# Patient Record
Sex: FEMALE | NOT HISPANIC OR LATINO
[De-identification: names, ages, dates, MRNs, and addresses within clinical notes are randomized per-mention and may not be internally consistent; named-entity substitution may affect disease eponyms.]

---

## 2018-12-19 ENCOUNTER — HOSPITAL (OUTPATIENT)
Dept: OTHER | Age: 33
End: 2018-12-19

## 2020-12-23 ENCOUNTER — HOSPITAL ENCOUNTER (OUTPATIENT)
Dept: ULTRASOUND IMAGING | Age: 35
Discharge: HOME | End: 2020-12-23
Attending: FAMILY MEDICINE

## 2020-12-23 DIAGNOSIS — E04.1 NONTOXIC SINGLE THYROID NODULE: ICD-10-CM

## 2020-12-23 PROCEDURE — 76536 US EXAM OF HEAD AND NECK: CPT

## 2021-02-03 ENCOUNTER — APPOINTMENT (OUTPATIENT)
Dept: ULTRASOUND IMAGING | Age: 36
End: 2021-02-03
Attending: OTOLARYNGOLOGY

## 2021-02-05 ENCOUNTER — LAB SERVICES (OUTPATIENT)
Dept: LAB | Age: 36
End: 2021-02-05

## 2021-02-05 DIAGNOSIS — Z01.818 PREPROCEDURAL EXAMINATION: Primary | ICD-10-CM

## 2021-02-05 LAB
SARS-COV-2 RNA RESP QL NAA+PROBE: NOT DETECTED
SERVICE CMNT-IMP: NORMAL
SERVICE CMNT-IMP: NORMAL

## 2021-02-05 PROCEDURE — U0003 INFECTIOUS AGENT DETECTION BY NUCLEIC ACID (DNA OR RNA); SEVERE ACUTE RESPIRATORY SYNDROME CORONAVIRUS 2 (SARS-COV-2) (CORONAVIRUS DISEASE [COVID-19]), AMPLIFIED PROBE TECHNIQUE, MAKING USE OF HIGH THROUGHPUT TECHNOLOGIES AS DESCRIBED BY CMS-2020-01-R: HCPCS | Performed by: RADIOLOGY

## 2021-02-05 PROCEDURE — U0005 INFEC AGEN DETEC AMPLI PROBE: HCPCS | Performed by: RADIOLOGY

## 2021-02-08 ENCOUNTER — HOSPITAL ENCOUNTER (OUTPATIENT)
Dept: ULTRASOUND IMAGING | Age: 36
Discharge: HOME | End: 2021-02-08
Attending: OTOLARYNGOLOGY

## 2021-02-08 DIAGNOSIS — E04.1 THYROID NODULE: ICD-10-CM

## 2021-02-08 PROCEDURE — 88177 CYTP FNA EVAL EA ADDL: CPT | Performed by: OTOLARYNGOLOGY

## 2021-02-08 PROCEDURE — 10005 FNA BX W/US GDN 1ST LES: CPT

## 2021-02-08 PROCEDURE — 10002801 HB RX 250 W/O HCPCS: Performed by: OTOLARYNGOLOGY

## 2021-02-08 PROCEDURE — 88173 CYTOPATH EVAL FNA REPORT: CPT | Performed by: OTOLARYNGOLOGY

## 2021-02-08 RX ORDER — LIDOCAINE HYDROCHLORIDE 10 MG/ML
10 INJECTION, SOLUTION INFILTRATION; PERINEURAL ONCE
Status: COMPLETED | OUTPATIENT
Start: 2021-02-08 | End: 2021-02-08

## 2021-02-08 RX ADMIN — LIDOCAINE HYDROCHLORIDE 100 MG: 10 INJECTION, SOLUTION INFILTRATION; PERINEURAL at 14:07

## 2021-02-11 LAB
ASR DISCLAIMER: NORMAL
CASE RPRT: NORMAL
CLINICAL INFO: NORMAL
PATH REPORT.FINAL DX SPEC: NORMAL
PATH REPORT.GROSS SPEC: NORMAL
PATH REPORT.INTRAOP OBS SPEC DOC: NORMAL
PATH REPORT.MICROSCOPIC SPEC OTHER STN: NORMAL

## 2021-10-20 ENCOUNTER — OFFICE VISIT (OUTPATIENT)
Dept: INTERNAL MEDICINE CLINIC | Facility: CLINIC | Age: 36
End: 2021-10-20
Payer: COMMERCIAL

## 2021-10-20 VITALS
WEIGHT: 140.19 LBS | RESPIRATION RATE: 18 BRPM | HEART RATE: 103 BPM | TEMPERATURE: 100 F | SYSTOLIC BLOOD PRESSURE: 109 MMHG | BODY MASS INDEX: 23.36 KG/M2 | DIASTOLIC BLOOD PRESSURE: 68 MMHG | HEIGHT: 64.96 IN

## 2021-10-20 DIAGNOSIS — M79.10 MUSCLE PAIN: ICD-10-CM

## 2021-10-20 DIAGNOSIS — E55.9 VITAMIN D DEFICIENCY: ICD-10-CM

## 2021-10-20 DIAGNOSIS — L81.9 HYPOPIGMENTATION: ICD-10-CM

## 2021-10-20 DIAGNOSIS — R07.89 OTHER CHEST PAIN: ICD-10-CM

## 2021-10-20 DIAGNOSIS — E04.1 THYROID NODULE: ICD-10-CM

## 2021-10-20 DIAGNOSIS — E06.9 THYROIDITIS: ICD-10-CM

## 2021-10-20 DIAGNOSIS — Z00.00 PHYSICAL EXAM, ANNUAL: Primary | ICD-10-CM

## 2021-10-20 DIAGNOSIS — L65.9 HAIR LOSS: ICD-10-CM

## 2021-10-20 PROCEDURE — 99385 PREV VISIT NEW AGE 18-39: CPT | Performed by: INTERNAL MEDICINE

## 2021-10-20 PROCEDURE — 3008F BODY MASS INDEX DOCD: CPT | Performed by: INTERNAL MEDICINE

## 2021-10-20 PROCEDURE — 3074F SYST BP LT 130 MM HG: CPT | Performed by: INTERNAL MEDICINE

## 2021-10-20 PROCEDURE — 3078F DIAST BP <80 MM HG: CPT | Performed by: INTERNAL MEDICINE

## 2021-10-20 RX ORDER — FERROUS SULFATE 325(65) MG
TABLET ORAL
COMMUNITY
Start: 2021-08-09

## 2021-10-20 RX ORDER — LEVOTHYROXINE SODIUM 0.05 MG/1
TABLET ORAL
COMMUNITY
Start: 2021-06-07 | End: 2021-12-23

## 2021-10-20 RX ORDER — ERGOCALCIFEROL 1.25 MG/1
CAPSULE ORAL
COMMUNITY
Start: 2021-08-09

## 2021-10-21 ENCOUNTER — LAB ENCOUNTER (OUTPATIENT)
Dept: LAB | Age: 36
End: 2021-10-21
Attending: INTERNAL MEDICINE
Payer: COMMERCIAL

## 2021-10-21 ENCOUNTER — HOSPITAL ENCOUNTER (OUTPATIENT)
Dept: GENERAL RADIOLOGY | Age: 36
Discharge: HOME | End: 2021-10-21
Attending: INTERNAL MEDICINE
Payer: COMMERCIAL

## 2021-10-21 DIAGNOSIS — E06.9 THYROIDITIS: ICD-10-CM

## 2021-10-21 DIAGNOSIS — L65.9 HAIR LOSS: ICD-10-CM

## 2021-10-21 DIAGNOSIS — R07.89 OTHER CHEST PAIN: ICD-10-CM

## 2021-10-21 DIAGNOSIS — Z00.00 PHYSICAL EXAM, ANNUAL: ICD-10-CM

## 2021-10-21 DIAGNOSIS — M79.10 MUSCLE PAIN: ICD-10-CM

## 2021-10-21 DIAGNOSIS — E04.1 THYROID NODULE: ICD-10-CM

## 2021-10-21 PROCEDURE — 82607 VITAMIN B-12: CPT

## 2021-10-21 PROCEDURE — 85025 COMPLETE CBC W/AUTO DIFF WBC: CPT

## 2021-10-21 PROCEDURE — 84443 ASSAY THYROID STIM HORMONE: CPT

## 2021-10-21 PROCEDURE — 36415 COLL VENOUS BLD VENIPUNCTURE: CPT

## 2021-10-21 PROCEDURE — 80061 LIPID PANEL: CPT

## 2021-10-21 PROCEDURE — 80053 COMPREHEN METABOLIC PANEL: CPT

## 2021-10-21 PROCEDURE — 84466 ASSAY OF TRANSFERRIN: CPT

## 2021-10-21 PROCEDURE — 82746 ASSAY OF FOLIC ACID SERUM: CPT

## 2021-10-21 PROCEDURE — 83540 ASSAY OF IRON: CPT

## 2021-10-21 PROCEDURE — 84481 FREE ASSAY (FT-3): CPT

## 2021-10-21 PROCEDURE — 82728 ASSAY OF FERRITIN: CPT

## 2021-10-21 PROCEDURE — 71046 X-RAY EXAM CHEST 2 VIEWS: CPT | Performed by: INTERNAL MEDICINE

## 2021-10-21 PROCEDURE — 82306 VITAMIN D 25 HYDROXY: CPT

## 2021-10-21 PROCEDURE — 84439 ASSAY OF FREE THYROXINE: CPT

## 2021-10-21 PROCEDURE — 82550 ASSAY OF CK (CPK): CPT

## 2021-10-21 PROCEDURE — 86376 MICROSOMAL ANTIBODY EACH: CPT

## 2021-10-21 NOTE — PROGRESS NOTES
Subjective:   Patient ID: Henrik Jung is a 38year old female.   Presents for physical exam    HPI  Patient reports that she is not feeling well, 3 months ago levothyroxine dose was decreased she has been taking 50 mg 5 days a week and 25 mg 2 times a week vitamin D to treat vitamin D deficiency and iron supplement states that she has been anemic all her life.   No recent pregnancies or illness, denies rectal bleeding or excessive menstrual bleeding.  _________________    ROS:     Constitutional:  Negative fo deep cervical adenopathy. Left cervical: No superficial or deep cervical adenopathy. Neurological:      Mental Status: She is alert.          Assessment & Plan:   Physical exam, annual  (primary encounter diagnosis) patient will continue healthy diet,

## 2021-10-23 ENCOUNTER — TELEPHONE (OUTPATIENT)
Dept: FAMILY MEDICINE CLINIC | Facility: CLINIC | Age: 36
End: 2021-10-23

## 2021-11-20 ENCOUNTER — HOSPITAL ENCOUNTER (OUTPATIENT)
Dept: ULTRASOUND IMAGING | Age: 36
Discharge: HOME | End: 2021-11-20
Attending: INTERNAL MEDICINE
Payer: COMMERCIAL

## 2021-11-20 DIAGNOSIS — E06.9 THYROIDITIS: ICD-10-CM

## 2021-11-20 DIAGNOSIS — E04.1 THYROID NODULE: ICD-10-CM

## 2021-11-20 PROCEDURE — 76536 US EXAM OF HEAD AND NECK: CPT | Performed by: INTERNAL MEDICINE

## 2021-12-23 ENCOUNTER — OFFICE VISIT (OUTPATIENT)
Dept: ENDOCRINOLOGY CLINIC | Facility: CLINIC | Age: 36
End: 2021-12-23
Payer: COMMERCIAL

## 2021-12-23 VITALS — HEART RATE: 81 BPM | DIASTOLIC BLOOD PRESSURE: 67 MMHG | SYSTOLIC BLOOD PRESSURE: 114 MMHG

## 2021-12-23 DIAGNOSIS — E04.2 MULTIPLE THYROID NODULES: ICD-10-CM

## 2021-12-23 DIAGNOSIS — E03.9 HYPOTHYROIDISM, UNSPECIFIED TYPE: Primary | ICD-10-CM

## 2021-12-23 PROCEDURE — 3074F SYST BP LT 130 MM HG: CPT | Performed by: INTERNAL MEDICINE

## 2021-12-23 PROCEDURE — 99204 OFFICE O/P NEW MOD 45 MIN: CPT | Performed by: INTERNAL MEDICINE

## 2021-12-23 PROCEDURE — 3078F DIAST BP <80 MM HG: CPT | Performed by: INTERNAL MEDICINE

## 2021-12-23 RX ORDER — LEVOTHYROXINE SODIUM 0.03 MG/1
25 TABLET ORAL
COMMUNITY
End: 2021-12-23

## 2021-12-23 RX ORDER — LEVOTHYROXINE SODIUM 0.03 MG/1
25 TABLET ORAL
Qty: 90 TABLET | Refills: 0 | Status: SHIPPED | OUTPATIENT
Start: 2021-12-23 | End: 2022-03-23

## 2021-12-23 NOTE — H&P
Name: Jacobo Gregorio  Date: 12/23/2021    Referring Physician: No ref.  provider found    Patient presents with:  Consult: pt here to establish care with endocrinologist for thyroid problems; pt has been seen by an endo before todat appt; pt has been experien week., Disp: , Rfl:   •  ergocalciferol 1.25 MG (05660 UT) Oral Cap, , Disp: , Rfl:   •  Ferrous Sulfate 325 (65 Fe) MG Oral Tab, , Disp: , Rfl:      Allergies:   Not on File    REVIEW OF SYSTEMS  Eyes: no change in vision  Neurologic: no headache, general diameter in the midline.  Normal echogenicity.  No masses.         OTHER: No masses or adenopathy.         Impression   CONCLUSION: Normal examination.              ASSESSMENT/PLAN: -This is a 39year-old female patient with a 1 year history of hypothyroid

## 2022-01-26 ENCOUNTER — OFFICE VISIT (OUTPATIENT)
Dept: DERMATOLOGY CLINIC | Facility: CLINIC | Age: 37
End: 2022-01-26
Payer: COMMERCIAL

## 2022-01-26 DIAGNOSIS — L65.9 ALOPECIA: Primary | ICD-10-CM

## 2022-01-26 DIAGNOSIS — L30.9 DERMATITIS: ICD-10-CM

## 2022-01-26 PROCEDURE — 99203 OFFICE O/P NEW LOW 30 MIN: CPT | Performed by: DERMATOLOGY

## 2022-01-26 RX ORDER — KETOCONAZOLE 20 MG/ML
SHAMPOO TOPICAL
Qty: 120 ML | Refills: 11 | Status: SHIPPED | OUTPATIENT
Start: 2022-01-26

## 2022-01-26 RX ORDER — FLUOCINONIDE TOPICAL SOLUTION USP, 0.05% 0.5 MG/ML
SOLUTION TOPICAL
Qty: 60 ML | Refills: 3 | Status: SHIPPED | OUTPATIENT
Start: 2022-01-26

## 2022-02-18 ENCOUNTER — LAB ENCOUNTER (OUTPATIENT)
Dept: LAB | Age: 37
End: 2022-02-18
Attending: DERMATOLOGY
Payer: COMMERCIAL

## 2022-02-18 DIAGNOSIS — E03.9 HYPOTHYROIDISM, UNSPECIFIED TYPE: ICD-10-CM

## 2022-02-18 DIAGNOSIS — L65.9 ALOPECIA: ICD-10-CM

## 2022-02-18 DIAGNOSIS — L30.9 DERMATITIS: ICD-10-CM

## 2022-02-18 LAB
ERYTHROCYTE [SEDIMENTATION RATE] IN BLOOD: 12 MM/HR
IGA SERPL-MCNC: 282 MG/DL (ref 70–312)
T4 FREE SERPL-MCNC: 0.9 NG/DL (ref 0.8–1.7)
TSI SER-ACNC: 0.63 MIU/ML (ref 0.36–3.74)

## 2022-02-18 PROCEDURE — 82784 ASSAY IGA/IGD/IGG/IGM EACH: CPT

## 2022-02-18 PROCEDURE — 85652 RBC SED RATE AUTOMATED: CPT

## 2022-02-18 PROCEDURE — 84439 ASSAY OF FREE THYROXINE: CPT

## 2022-02-18 PROCEDURE — 36415 COLL VENOUS BLD VENIPUNCTURE: CPT

## 2022-02-18 PROCEDURE — 84443 ASSAY THYROID STIM HORMONE: CPT

## 2022-02-18 PROCEDURE — 86364 TISS TRNSGLTMNASE EA IG CLAS: CPT

## 2022-02-18 PROCEDURE — 86038 ANTINUCLEAR ANTIBODIES: CPT

## 2022-02-18 RX ORDER — LEVOTHYROXINE SODIUM 0.03 MG/1
TABLET ORAL
Qty: 90 TABLET | Refills: 0 | Status: SHIPPED | OUTPATIENT
Start: 2022-02-18

## 2022-02-21 ENCOUNTER — OFFICE VISIT (OUTPATIENT)
Dept: ENDOCRINOLOGY CLINIC | Facility: CLINIC | Age: 37
End: 2022-02-21
Payer: COMMERCIAL

## 2022-02-21 VITALS
WEIGHT: 140 LBS | RESPIRATION RATE: 16 BRPM | SYSTOLIC BLOOD PRESSURE: 123 MMHG | DIASTOLIC BLOOD PRESSURE: 71 MMHG | HEART RATE: 86 BPM | HEIGHT: 65 IN | BODY MASS INDEX: 23.32 KG/M2

## 2022-02-21 DIAGNOSIS — R94.6 ABNORMAL THYROID FUNCTION TEST: Primary | ICD-10-CM

## 2022-02-21 DIAGNOSIS — R53.83 FATIGUE, UNSPECIFIED TYPE: ICD-10-CM

## 2022-02-21 DIAGNOSIS — E55.9 VITAMIN D DEFICIENCY: ICD-10-CM

## 2022-02-21 LAB — NUCLEAR IGG TITR SER IF: NEGATIVE {TITER}

## 2022-02-21 PROCEDURE — 3074F SYST BP LT 130 MM HG: CPT | Performed by: INTERNAL MEDICINE

## 2022-02-21 PROCEDURE — 3008F BODY MASS INDEX DOCD: CPT | Performed by: INTERNAL MEDICINE

## 2022-02-21 PROCEDURE — 99214 OFFICE O/P EST MOD 30 MIN: CPT | Performed by: INTERNAL MEDICINE

## 2022-02-21 PROCEDURE — 3078F DIAST BP <80 MM HG: CPT | Performed by: INTERNAL MEDICINE

## 2022-02-22 LAB — TTG IGA SER-ACNC: 0.7 U/ML (ref ?–7)

## 2022-05-26 ENCOUNTER — LAB ENCOUNTER (OUTPATIENT)
Dept: LAB | Age: 37
End: 2022-05-26
Attending: INTERNAL MEDICINE
Payer: COMMERCIAL

## 2022-05-26 DIAGNOSIS — R94.6 ABNORMAL THYROID FUNCTION TEST: ICD-10-CM

## 2022-05-26 DIAGNOSIS — E55.9 VITAMIN D DEFICIENCY: ICD-10-CM

## 2022-05-26 DIAGNOSIS — R53.83 FATIGUE, UNSPECIFIED TYPE: ICD-10-CM

## 2022-05-26 LAB
T4 FREE SERPL-MCNC: 1.5 NG/DL (ref 0.8–1.7)
TSI SER-ACNC: 0.01 MIU/ML (ref 0.36–3.74)
VIT B12 SERPL-MCNC: 417 PG/ML (ref 193–986)
VIT D+METAB SERPL-MCNC: 29.6 NG/ML (ref 30–100)

## 2022-05-26 PROCEDURE — 84445 ASSAY OF TSI GLOBULIN: CPT

## 2022-05-26 PROCEDURE — 82607 VITAMIN B-12: CPT

## 2022-05-26 PROCEDURE — 84439 ASSAY OF FREE THYROXINE: CPT

## 2022-05-26 PROCEDURE — 82306 VITAMIN D 25 HYDROXY: CPT

## 2022-05-26 PROCEDURE — 36415 COLL VENOUS BLD VENIPUNCTURE: CPT

## 2022-05-26 PROCEDURE — 84443 ASSAY THYROID STIM HORMONE: CPT

## 2022-05-30 ENCOUNTER — TELEPHONE (OUTPATIENT)
Dept: ENDOCRINOLOGY CLINIC | Facility: CLINIC | Age: 37
End: 2022-05-30

## 2022-05-30 DIAGNOSIS — R94.6 ABNORMAL THYROID FUNCTION TEST: Primary | ICD-10-CM

## 2022-06-01 LAB — THYROID STIMULATING IMMUNOGLOBULIN: <0.1 IU/L

## 2022-06-02 ENCOUNTER — OFFICE VISIT (OUTPATIENT)
Dept: ENDOCRINOLOGY CLINIC | Facility: CLINIC | Age: 37
End: 2022-06-02
Payer: COMMERCIAL

## 2022-06-02 VITALS
WEIGHT: 140 LBS | RESPIRATION RATE: 18 BRPM | DIASTOLIC BLOOD PRESSURE: 69 MMHG | SYSTOLIC BLOOD PRESSURE: 114 MMHG | HEART RATE: 91 BPM | BODY MASS INDEX: 23.32 KG/M2 | HEIGHT: 65 IN

## 2022-06-02 DIAGNOSIS — R94.6 ABNORMAL THYROID FUNCTION TEST: Primary | ICD-10-CM

## 2022-06-02 DIAGNOSIS — R00.0 TACHYCARDIA: ICD-10-CM

## 2022-06-02 PROCEDURE — 3074F SYST BP LT 130 MM HG: CPT | Performed by: INTERNAL MEDICINE

## 2022-06-02 PROCEDURE — 3078F DIAST BP <80 MM HG: CPT | Performed by: INTERNAL MEDICINE

## 2022-06-02 PROCEDURE — 99214 OFFICE O/P EST MOD 30 MIN: CPT | Performed by: INTERNAL MEDICINE

## 2022-06-02 PROCEDURE — 3008F BODY MASS INDEX DOCD: CPT | Performed by: INTERNAL MEDICINE

## 2022-06-02 RX ORDER — ATENOLOL 25 MG/1
25 TABLET ORAL DAILY
Qty: 90 TABLET | Refills: 0 | Status: SHIPPED | OUTPATIENT
Start: 2022-06-02 | End: 2022-08-31

## 2022-06-03 ENCOUNTER — TELEPHONE (OUTPATIENT)
Dept: ENDOCRINOLOGY CLINIC | Facility: CLINIC | Age: 37
End: 2022-06-03

## 2022-06-03 NOTE — TELEPHONE ENCOUNTER
Pt states that her heart rate is under 60 after starting new med Atanolol. Call transferred to [employer] who will reach RN as I was not able to reach nurse in office or priority line.

## 2022-06-03 NOTE — TELEPHONE ENCOUNTER
Spoke to patient to regarding Dr. Hayes Cerda recommendations below. Patient verbalized understanding--stating she will cut tablet in half.

## 2022-06-03 NOTE — TELEPHONE ENCOUNTER
Dr. Juline Boast Dr. Domingo Servant to patient who stated she started Atenolol today--25 mg in the morning--she said she is feeling really lethargic and \"lazy\" and HR has been around 68--mid to lower 60s. I asked her to take her HR now, and it is 68. Please advise.

## 2022-06-06 NOTE — TELEPHONE ENCOUNTER
Dr. Janice Bland to patient -she states on Saturday she took 1/2 tablet and HR was 54  Sunday she took 1/4 tablet and HR was between 60-70 (patient stated it did not go below 60)  Patient stated she takes atenolol at 10am and will take 1/4 tablet again today and monitor HR  Please advise -thanks

## 2022-06-06 NOTE — TELEPHONE ENCOUNTER
Ok, noted. Agree with plan to take 1/4 tablet daily - are her palpitations improved? Please continue current dose for 2 days and then update on symptoms. Thanks.

## 2022-06-06 NOTE — TELEPHONE ENCOUNTER
Called patient and relayed below recommendation as outlined, and patient was agreeable. She voiced understanding and will call in 2 days for symptom update. She denies questions at this time.     LEVY Martinez [phone]

## 2022-06-10 ENCOUNTER — LAB ENCOUNTER (OUTPATIENT)
Dept: LAB | Age: 37
End: 2022-06-10
Attending: INTERNAL MEDICINE
Payer: COMMERCIAL

## 2022-06-10 DIAGNOSIS — R94.6 ABNORMAL THYROID FUNCTION TEST: ICD-10-CM

## 2022-06-10 LAB
T4 FREE SERPL-MCNC: 1 NG/DL (ref 0.8–1.7)
TSI SER-ACNC: 0.02 MIU/ML (ref 0.36–3.74)

## 2022-06-10 PROCEDURE — 84443 ASSAY THYROID STIM HORMONE: CPT

## 2022-06-10 PROCEDURE — 84439 ASSAY OF FREE THYROXINE: CPT

## 2022-06-10 PROCEDURE — 36415 COLL VENOUS BLD VENIPUNCTURE: CPT

## 2022-06-21 ENCOUNTER — TELEPHONE (OUTPATIENT)
Dept: ENDOCRINOLOGY CLINIC | Facility: CLINIC | Age: 37
End: 2022-06-21

## 2022-06-21 NOTE — TELEPHONE ENCOUNTER
rn called patient spoke with  (Eneida) and gave him message to stop medications atenolol for heart .   Verbalized understanding

## 2022-06-21 NOTE — TELEPHONE ENCOUNTER
Dr. Erna Coe,    Please advise. Patient stated her BP has been low and her HR has been low--she is taking 1/4 of the Atenolol. She stated she has been feeling sluggish. Patient has thyroid uptake and scan scheduled for tomorrow.

## 2022-06-22 ENCOUNTER — HOSPITAL ENCOUNTER (OUTPATIENT)
Dept: NUCLEAR MEDICINE | Facility: HOSPITAL | Age: 37
Discharge: HOME | End: 2022-06-22
Attending: INTERNAL MEDICINE
Payer: COMMERCIAL

## 2022-06-22 DIAGNOSIS — E05.90 SUBCLINICAL HYPERTHYROIDISM: ICD-10-CM

## 2022-06-22 PROCEDURE — 78014 THYROID IMAGING W/BLOOD FLOW: CPT | Performed by: INTERNAL MEDICINE

## 2022-06-27 ENCOUNTER — TELEPHONE (OUTPATIENT)
Dept: ENDOCRINOLOGY CLINIC | Facility: CLINIC | Age: 37
End: 2022-06-27

## 2022-06-27 DIAGNOSIS — E06.9 THYROIDITIS: Primary | ICD-10-CM

## 2022-06-28 NOTE — TELEPHONE ENCOUNTER
Dr. Feli Smiley to patient -she would appreciate call this evening - she will be available between 6-8pm  Thanks

## 2022-06-28 NOTE — TELEPHONE ENCOUNTER
Hi!    Please let her know that I would like to call her and discuss the results in detail. Can she give me a good time to speak with her in the evening today? Thank you!

## 2022-06-28 NOTE — TELEPHONE ENCOUNTER
I called this patient and explained to her the test results. She will obtain repeat TSH and FT4 the third week of July. Thank you!

## 2022-07-25 ENCOUNTER — OFFICE VISIT (OUTPATIENT)
Dept: INTERNAL MEDICINE CLINIC | Facility: CLINIC | Age: 37
End: 2022-07-25
Payer: COMMERCIAL

## 2022-07-25 ENCOUNTER — LAB ENCOUNTER (OUTPATIENT)
Dept: LAB | Age: 37
End: 2022-07-25
Attending: NURSE PRACTITIONER
Payer: COMMERCIAL

## 2022-07-25 VITALS
DIASTOLIC BLOOD PRESSURE: 69 MMHG | WEIGHT: 148 LBS | HEIGHT: 65 IN | SYSTOLIC BLOOD PRESSURE: 107 MMHG | HEART RATE: 91 BPM | BODY MASS INDEX: 24.66 KG/M2

## 2022-07-25 DIAGNOSIS — Z02.1 ENCOUNTER FOR PRE-EMPLOYMENT EXAMINATION: ICD-10-CM

## 2022-07-25 DIAGNOSIS — Z02.1 ENCOUNTER FOR PRE-EMPLOYMENT EXAMINATION: Primary | ICD-10-CM

## 2022-07-25 DIAGNOSIS — E06.9 THYROIDITIS: ICD-10-CM

## 2022-07-25 LAB
T4 FREE SERPL-MCNC: 0.6 NG/DL (ref 0.8–1.7)
TSI SER-ACNC: 20.1 MIU/ML (ref 0.36–3.74)

## 2022-07-25 PROCEDURE — 36415 COLL VENOUS BLD VENIPUNCTURE: CPT

## 2022-07-25 PROCEDURE — 86480 TB TEST CELL IMMUN MEASURE: CPT

## 2022-07-25 PROCEDURE — 84443 ASSAY THYROID STIM HORMONE: CPT

## 2022-07-25 PROCEDURE — 84439 ASSAY OF FREE THYROXINE: CPT

## 2022-07-26 ENCOUNTER — TELEPHONE (OUTPATIENT)
Dept: ENDOCRINOLOGY CLINIC | Facility: CLINIC | Age: 37
End: 2022-07-26

## 2022-07-26 DIAGNOSIS — E06.9 THYROIDITIS: Primary | ICD-10-CM

## 2022-07-26 NOTE — TELEPHONE ENCOUNTER
Dr. Alfredo Ugalde,  Please advise:    Component      Latest Ref Rng & Units 7/25/2022   T4,Free (Direct)      0.8 - 1.7 ng/dL 0.6 (L)   TSH      0.358 - 3.740 mIU/mL 20.100 (H)   Thanks

## 2022-07-27 LAB
M TB IFN-G CD4+ T-CELLS BLD-ACNC: 0.03 IU/ML
M TB TUBERC IFN-G BLD QL: NEGATIVE
M TB TUBERC IGNF/MITOGEN IGNF CONTROL: >10 IU/ML
QFT TB1 AG MINUS NIL: 0.02 IU/ML
QFT TB2 AG MINUS NIL: 0.02 IU/ML

## 2022-07-27 NOTE — TELEPHONE ENCOUNTER
Hi!  Please let patient know that these results can be seen since patient is recovering from thyroiditis. Please ask her how she is feeling and that my recommendation would be to monitor her thyroid function tests for now and not to treat with any medication for now. I will order TSH and FT4 for 6 weeks from now and I would like to see her in follow up at that time. Thank you!

## 2022-07-27 NOTE — TELEPHONE ENCOUNTER
Dr. Rabia Avila to patient to relay message below - patient stated understanding  Patient c/o of being tired and muscles achy; patient denies any heart racing/rapid hearbeat    Patient stated understanding to repeat labs in 6 weeks and will call to schedule f/u once she looks at her work schedule  Thanks

## 2022-09-09 ENCOUNTER — LAB ENCOUNTER (OUTPATIENT)
Dept: LAB | Age: 37
End: 2022-09-09
Attending: HOSPITALIST
Payer: COMMERCIAL

## 2022-09-09 DIAGNOSIS — E06.9 THYROIDITIS: ICD-10-CM

## 2022-09-09 LAB
T4 FREE SERPL-MCNC: 0.8 NG/DL (ref 0.8–1.7)
TSI SER-ACNC: 1.99 MIU/ML (ref 0.36–3.74)

## 2022-09-09 PROCEDURE — 36415 COLL VENOUS BLD VENIPUNCTURE: CPT

## 2022-09-09 PROCEDURE — 84443 ASSAY THYROID STIM HORMONE: CPT

## 2022-09-09 PROCEDURE — 84439 ASSAY OF FREE THYROXINE: CPT

## 2022-09-12 ENCOUNTER — OFFICE VISIT (OUTPATIENT)
Dept: ENDOCRINOLOGY CLINIC | Facility: CLINIC | Age: 37
End: 2022-09-12
Payer: COMMERCIAL

## 2022-09-12 VITALS
BODY MASS INDEX: 24.49 KG/M2 | HEIGHT: 65 IN | WEIGHT: 147 LBS | HEART RATE: 83 BPM | RESPIRATION RATE: 16 BRPM | DIASTOLIC BLOOD PRESSURE: 71 MMHG | SYSTOLIC BLOOD PRESSURE: 108 MMHG

## 2022-09-12 DIAGNOSIS — E06.9 THYROIDITIS: Primary | ICD-10-CM

## 2022-09-12 PROCEDURE — 99214 OFFICE O/P EST MOD 30 MIN: CPT | Performed by: INTERNAL MEDICINE

## 2022-09-12 PROCEDURE — 3008F BODY MASS INDEX DOCD: CPT | Performed by: INTERNAL MEDICINE

## 2022-09-12 PROCEDURE — 3078F DIAST BP <80 MM HG: CPT | Performed by: INTERNAL MEDICINE

## 2022-09-12 PROCEDURE — 3074F SYST BP LT 130 MM HG: CPT | Performed by: INTERNAL MEDICINE

## 2022-09-20 ENCOUNTER — OFFICE VISIT (OUTPATIENT)
Dept: INTERNAL MEDICINE CLINIC | Facility: CLINIC | Age: 37
End: 2022-09-20

## 2022-09-20 VITALS
BODY MASS INDEX: 24.99 KG/M2 | OXYGEN SATURATION: 99 % | DIASTOLIC BLOOD PRESSURE: 72 MMHG | WEIGHT: 150 LBS | HEIGHT: 65 IN | SYSTOLIC BLOOD PRESSURE: 111 MMHG | HEART RATE: 99 BPM

## 2022-09-20 DIAGNOSIS — R29.898 RIGHT LEG WEAKNESS: Primary | ICD-10-CM

## 2022-09-20 PROCEDURE — 99213 OFFICE O/P EST LOW 20 MIN: CPT | Performed by: INTERNAL MEDICINE

## 2022-09-20 PROCEDURE — 3074F SYST BP LT 130 MM HG: CPT | Performed by: INTERNAL MEDICINE

## 2022-09-20 PROCEDURE — 3078F DIAST BP <80 MM HG: CPT | Performed by: INTERNAL MEDICINE

## 2022-09-20 PROCEDURE — 3008F BODY MASS INDEX DOCD: CPT | Performed by: INTERNAL MEDICINE

## 2022-11-10 ENCOUNTER — OFFICE VISIT (OUTPATIENT)
Dept: INTERNAL MEDICINE CLINIC | Facility: CLINIC | Age: 37
End: 2022-11-10
Payer: COMMERCIAL

## 2022-11-10 VITALS
BODY MASS INDEX: 25.16 KG/M2 | DIASTOLIC BLOOD PRESSURE: 74 MMHG | TEMPERATURE: 100 F | SYSTOLIC BLOOD PRESSURE: 110 MMHG | HEART RATE: 98 BPM | HEIGHT: 65 IN | WEIGHT: 151 LBS

## 2022-11-10 DIAGNOSIS — J02.9 SORE THROAT: Primary | ICD-10-CM

## 2022-11-10 LAB
CONTROL LINE PRESENT WITH A CLEAR BACKGROUND (YES/NO): YES YES/NO
KIT LOT #: NORMAL NUMERIC
STREP GRP A CUL-SCR: POSITIVE

## 2022-11-10 PROCEDURE — 3074F SYST BP LT 130 MM HG: CPT | Performed by: PHYSICIAN ASSISTANT

## 2022-11-10 PROCEDURE — 87880 STREP A ASSAY W/OPTIC: CPT | Performed by: PHYSICIAN ASSISTANT

## 2022-11-10 PROCEDURE — 3008F BODY MASS INDEX DOCD: CPT | Performed by: PHYSICIAN ASSISTANT

## 2022-11-10 PROCEDURE — 3078F DIAST BP <80 MM HG: CPT | Performed by: PHYSICIAN ASSISTANT

## 2022-11-10 PROCEDURE — 99213 OFFICE O/P EST LOW 20 MIN: CPT | Performed by: PHYSICIAN ASSISTANT

## 2022-11-10 RX ORDER — AMOXICILLIN 500 MG/1
500 CAPSULE ORAL 2 TIMES DAILY
Qty: 20 CAPSULE | Refills: 0 | Status: SHIPPED | OUTPATIENT
Start: 2022-11-10 | End: 2022-11-20

## 2022-12-16 ENCOUNTER — LAB ENCOUNTER (OUTPATIENT)
Dept: LAB | Age: 37
End: 2022-12-16
Attending: INTERNAL MEDICINE
Payer: COMMERCIAL

## 2022-12-16 ENCOUNTER — PATIENT MESSAGE (OUTPATIENT)
Dept: ENDOCRINOLOGY CLINIC | Facility: CLINIC | Age: 37
End: 2022-12-16

## 2022-12-16 DIAGNOSIS — E06.9 THYROIDITIS: Primary | ICD-10-CM

## 2022-12-16 DIAGNOSIS — E06.9 THYROIDITIS: ICD-10-CM

## 2022-12-16 LAB
T4 FREE SERPL-MCNC: 0.9 NG/DL (ref 0.8–1.7)
TSI SER-ACNC: 1.27 MIU/ML (ref 0.36–3.74)

## 2022-12-16 PROCEDURE — 84443 ASSAY THYROID STIM HORMONE: CPT

## 2022-12-16 PROCEDURE — 36415 COLL VENOUS BLD VENIPUNCTURE: CPT

## 2022-12-16 PROCEDURE — 84439 ASSAY OF FREE THYROXINE: CPT

## 2022-12-27 NOTE — TELEPHONE ENCOUNTER
Patient notified to have thyroid blood work repeated again in 6 months. Lab orders placed by provider.

## 2023-05-25 ENCOUNTER — OFFICE VISIT (OUTPATIENT)
Dept: FAMILY MEDICINE | Age: 38
End: 2023-05-25

## 2023-05-25 VITALS
DIASTOLIC BLOOD PRESSURE: 74 MMHG | OXYGEN SATURATION: 100 % | WEIGHT: 146.83 LBS | TEMPERATURE: 98.4 F | HEIGHT: 65 IN | RESPIRATION RATE: 17 BRPM | HEART RATE: 99 BPM | SYSTOLIC BLOOD PRESSURE: 116 MMHG | BODY MASS INDEX: 24.46 KG/M2

## 2023-05-25 DIAGNOSIS — Z11.9 SCREENING EXAMINATION FOR INFECTIOUS DISEASE: ICD-10-CM

## 2023-05-25 DIAGNOSIS — Z00.00 PE (PHYSICAL EXAM), ANNUAL: Primary | ICD-10-CM

## 2023-05-25 PROCEDURE — 99395 PREV VISIT EST AGE 18-39: CPT | Performed by: FAMILY MEDICINE

## 2023-05-25 PROCEDURE — 86480 TB TEST CELL IMMUN MEASURE: CPT | Performed by: INTERNAL MEDICINE

## 2023-05-25 PROCEDURE — 80061 LIPID PANEL: CPT | Performed by: INTERNAL MEDICINE

## 2023-05-25 PROCEDURE — 85025 COMPLETE CBC W/AUTO DIFF WBC: CPT | Performed by: INTERNAL MEDICINE

## 2023-05-25 PROCEDURE — 80053 COMPREHEN METABOLIC PANEL: CPT | Performed by: INTERNAL MEDICINE

## 2023-05-25 PROCEDURE — 36415 COLL VENOUS BLD VENIPUNCTURE: CPT | Performed by: FAMILY MEDICINE

## 2023-05-25 PROCEDURE — 84443 ASSAY THYROID STIM HORMONE: CPT | Performed by: INTERNAL MEDICINE

## 2023-05-25 ASSESSMENT — PATIENT HEALTH QUESTIONNAIRE - PHQ9
1. LITTLE INTEREST OR PLEASURE IN DOING THINGS: NOT AT ALL
CLINICAL INTERPRETATION OF PHQ2 SCORE: NO FURTHER SCREENING NEEDED
SUM OF ALL RESPONSES TO PHQ9 QUESTIONS 1 AND 2: 0
2. FEELING DOWN, DEPRESSED OR HOPELESS: NOT AT ALL
SUM OF ALL RESPONSES TO PHQ9 QUESTIONS 1 AND 2: 0

## 2023-05-25 ASSESSMENT — PAIN SCALES - GENERAL: PAINLEVEL: 0

## 2023-05-26 LAB
ALBUMIN SERPL-MCNC: 3.9 G/DL (ref 3.6–5.1)
ALBUMIN/GLOB SERPL: 1.1 {RATIO} (ref 1–2.4)
ALP SERPL-CCNC: 50 UNITS/L (ref 45–117)
ALT SERPL-CCNC: 19 UNITS/L
ANION GAP SERPL CALC-SCNC: 12 MMOL/L (ref 7–19)
AST SERPL-CCNC: 16 UNITS/L
BASOPHILS # BLD: 0.1 K/MCL (ref 0–0.3)
BASOPHILS NFR BLD: 1 %
BILIRUB SERPL-MCNC: 0.4 MG/DL (ref 0.2–1)
BUN SERPL-MCNC: 11 MG/DL (ref 6–20)
BUN/CREAT SERPL: 14 (ref 7–25)
CALCIUM SERPL-MCNC: 9.1 MG/DL (ref 8.4–10.2)
CHLORIDE SERPL-SCNC: 109 MMOL/L (ref 97–110)
CHOLEST SERPL-MCNC: 163 MG/DL
CHOLEST/HDLC SERPL: 4 {RATIO}
CO2 SERPL-SCNC: 23 MMOL/L (ref 21–32)
CREAT SERPL-MCNC: 0.77 MG/DL (ref 0.51–0.95)
DEPRECATED RDW RBC: 38.5 FL (ref 39–50)
EOSINOPHIL # BLD: 0.1 K/MCL (ref 0–0.5)
EOSINOPHIL NFR BLD: 2 %
ERYTHROCYTE [DISTWIDTH] IN BLOOD: 12 % (ref 11–15)
FASTING DURATION TIME PATIENT: NORMAL H
GFR SERPLBLD BASED ON 1.73 SQ M-ARVRAT: >90 ML/MIN
GLOBULIN SER-MCNC: 3.7 G/DL (ref 2–4)
GLUCOSE SERPL-MCNC: 86 MG/DL (ref 70–99)
HCT VFR BLD CALC: 40 % (ref 36–46.5)
HDLC SERPL-MCNC: 41 MG/DL
HGB BLD-MCNC: 13.2 G/DL (ref 12–15.5)
IMM GRANULOCYTES # BLD AUTO: 0 K/MCL (ref 0–0.2)
IMM GRANULOCYTES # BLD: 0 %
LDLC SERPL CALC-MCNC: 92 MG/DL
LYMPHOCYTES # BLD: 2.1 K/MCL (ref 1–4.8)
LYMPHOCYTES NFR BLD: 26 %
MCH RBC QN AUTO: 28.8 PG (ref 26–34)
MCHC RBC AUTO-ENTMCNC: 33 G/DL (ref 32–36.5)
MCV RBC AUTO: 87.1 FL (ref 78–100)
MONOCYTES # BLD: 0.5 K/MCL (ref 0.3–0.9)
MONOCYTES NFR BLD: 6 %
NEUTROPHILS # BLD: 5.4 K/MCL (ref 1.8–7.7)
NEUTROPHILS NFR BLD: 65 %
NONHDLC SERPL-MCNC: 122 MG/DL
NRBC BLD MANUAL-RTO: 0 /100 WBC
PLATELET # BLD AUTO: 241 K/MCL (ref 140–450)
POTASSIUM SERPL-SCNC: 4.9 MMOL/L (ref 3.4–5.1)
PROT SERPL-MCNC: 7.6 G/DL (ref 6.4–8.2)
RBC # BLD: 4.59 MIL/MCL (ref 4–5.2)
SODIUM SERPL-SCNC: 139 MMOL/L (ref 135–145)
TRIGL SERPL-MCNC: 151 MG/DL
TSH SERPL-ACNC: 1.13 MCUNITS/ML (ref 0.35–5)
WBC # BLD: 8.1 K/MCL (ref 4.2–11)

## 2023-05-27 LAB
GAMMA INTERFERON BACKGROUND BLD IA-ACNC: 0.04 IU/ML
M TB IFN-G BLD-IMP: NEGATIVE
M TB IFN-G CD4+ BCKGRND COR BLD-ACNC: 0 IU/ML
M TB IFN-G CD4+CD8+ BCKGRND COR BLD-ACNC: 0 IU/ML
MITOGEN IGNF BCKGRD COR BLD-ACNC: 7.85 IU/ML

## (undated) DIAGNOSIS — E03.9 HYPOTHYROIDISM, UNSPECIFIED TYPE: ICD-10-CM